# Patient Record
Sex: FEMALE | Race: WHITE | Employment: OTHER | URBAN - METROPOLITAN AREA
[De-identification: names, ages, dates, MRNs, and addresses within clinical notes are randomized per-mention and may not be internally consistent; named-entity substitution may affect disease eponyms.]

---

## 2018-04-03 RX ORDER — ATORVASTATIN CALCIUM 10 MG/1
10 TABLET, FILM COATED ORAL DAILY
COMMUNITY
End: 2018-06-18 | Stop reason: SDUPTHER

## 2018-04-03 RX ORDER — ENALAPRIL MALEATE 20 MG/1
20 TABLET ORAL DAILY
COMMUNITY
End: 2020-10-13

## 2018-08-14 PROBLEM — I10 ESSENTIAL HYPERTENSION: Status: ACTIVE | Noted: 2018-08-14

## 2018-08-14 PROBLEM — E55.9 VITAMIN D INSUFFICIENCY: Status: ACTIVE | Noted: 2018-08-14

## 2018-08-14 PROBLEM — E78.2 MIXED HYPERLIPIDEMIA: Status: ACTIVE | Noted: 2018-08-14

## 2020-07-08 ENCOUNTER — HOSPITAL ENCOUNTER (EMERGENCY)
Age: 80
Discharge: HOME | End: 2020-07-08
Payer: MEDICARE

## 2020-07-08 VITALS
SYSTOLIC BLOOD PRESSURE: 141 MMHG | HEART RATE: 96 BPM | WEIGHT: 195 LBS | BODY MASS INDEX: 38.28 KG/M2 | HEIGHT: 60 IN | TEMPERATURE: 97.3 F | DIASTOLIC BLOOD PRESSURE: 98 MMHG | OXYGEN SATURATION: 98 % | RESPIRATION RATE: 16 BRPM

## 2020-07-08 PROCEDURE — 99284 EMERGENCY DEPT VISIT MOD MDM: CPT

## 2020-07-08 PROCEDURE — U0003 INFECTIOUS AGENT DETECTION BY NUCLEIC ACID (DNA OR RNA); SEVERE ACUTE RESPIRATORY SYNDROME CORONAVIRUS 2 (SARS-COV-2) (CORONAVIRUS DISEASE [COVID-19]), AMPLIFIED PROBE TECHNIQUE, MAKING USE OF HIGH THROUGHPUT TECHNOLOGIES AS DESCRIBED BY CMS-2020-01-R: HCPCS

## 2020-07-08 NOTE — ED PROVIDER NOTES
[facility]    HPI:  7/8/20, Time: 7:49 PM EDT         Lafaye Crigler is a 67 y.o. female presenting to the ED for a COVID-19 test.  Patient states that her  was recently hospitalized and had an outpatient x-ray obtained in which they feel he is positive for the coronavirus. Patient states she wanted to get checked to ensure that she does not have it. She is denying any type of coughing, fevers fatigue or chills at this time. She is having no complaints. Review of Systems:   Pertinent positives and negatives are stated within HPI, all other systems reviewed and are negative.          --------------------------------------------- PAST HISTORY ---------------------------------------------  Past Medical History:  has a past medical history of Hyperlipidemia and Hypertension. Past Surgical History:  has no past surgical history on file. Social History:  reports that she has never smoked. She has never used smokeless tobacco. She reports that she does not drink alcohol or use drugs. Family History: family history is not on file. The patients home medications have been reviewed. Allergies: Patient has no known allergies. -------------------------------------------------- RESULTS -------------------------------------------------  All laboratory and radiology results have been personally reviewed by myself   LABS:  Results for orders placed or performed during the hospital encounter of 07/08/20   Covid-19 Ambulatory   Result Value Ref Range    Source NP swab        RADIOLOGY:  Interpreted by Radiologist.  No orders to display       ------------------------- NURSING NOTES AND VITALS REVIEWED ---------------------------   The nursing notes within the ED encounter and vital signs as below have been reviewed.    BP (!) 141/98   Pulse 96   Temp 97.3 °F (36.3 °C) (Temporal)   Resp 16   Ht 5' (1.524 m)   Wt 195 lb (88.5 kg)   LMP  (LMP Unknown)   SpO2 98%   BMI 38.08 kg/m²   Oxygen Saturation Interpretation: Normal      ---------------------------------------------------PHYSICAL EXAM--------------------------------------      Constitutional/General: Alert and oriented x3, well appearing, non toxic in NAD  Head: Normocephalic and atraumatic  Eyes: PERRL, EOMI  Mouth: Oropharynx clear, handling secretions, no trismus  Neck: Supple, full ROM,   Pulmonary: Lungs clear to auscultation bilaterally, no wheezes, rales, or rhonchi. Not in respiratory distress  Cardiovascular:  Regular rate and rhythm, no murmurs, gallops, or rubs. 2+ distal pulses  Abdomen: Soft, non tender, non distended,   Extremities: Moves all extremities x 4. Warm and well perfused  Skin: warm and dry without rash  Neurologic: GCS 15,  Psych: Normal Affect      ------------------------------ ED COURSE/MEDICAL DECISION MAKING----------------------  Medications - No data to display      ED COURSE:       Medical Decision Making:    Patient is a 79-year-old female who came to the emergency department for a COVID-19 test.  She is denying any types of symptoms at this time. Patient's oxygen saturation was 98%, her lungs were clear and equal bilaterally. A send out COVID-19 test was obtained at this time. I did inform her that it takes usually 3 days for results to come back. He is to follow with her primary care provider. I did also inform her that due to the fact that her  might be positive she needs to start quarantining herself from her friends and family. Counseling: The emergency provider has spoken with the patient and discussed todays results, in addition to providing specific details for the plan of care and counseling regarding the diagnosis and prognosis. Questions are answered at this time and they are agreeable with the plan.      --------------------------------- IMPRESSION AND DISPOSITION ---------------------------------    IMPRESSION  1.  Encounter for laboratory testing for COVID-19 virus DISPOSITION  Disposition: Discharge to home  Patient condition is good      NOTE: This report was transcribed using voice recognition software.  Every effort was made to ensure accuracy; however, inadvertent computerized transcription errors may be present      GABBY Saleem NP  07/08/20 1951

## 2020-07-09 ENCOUNTER — CARE COORDINATION (OUTPATIENT)
Dept: CARE COORDINATION | Age: 80
End: 2020-07-09

## 2020-07-09 NOTE — CARE COORDINATION
LSW attempted to contact Eric Gifford for COVID-19 follow up calls. There was no answer; VM and call back number left  Eric Gifford presented to the ER with request for COVID-19 testing. States  may have COVID-23 and she wanted to be tested to see if she had it. Test results are pending still.

## 2020-07-10 NOTE — CARE COORDINATION
Patient contacted regarding recent visit . This Ghazala Denton contacted the patient by telephone to perform post discharge call. Verified name and  with patient as identifiers. Provided introduction to self, and reason for call due to viral symptoms of infection and/or exposure to COVID-19. Patient presented to emergency department/flu clinic with complaints of COVID-19 testing per patient request.   Patient reports symptoms are not present, never had symptoms only went in for the testing. Due to no new or worsening symptoms the RN CTN/TIAN was not notified for escalation. Discussed exposure protocols and quarantine with CDC Guidelines What To Do If You Are Sick    Patient was given an opportunity for questions and concerns. Stay home except to get medical care    Separate yourself from other people and animals in your home    Call ahead before visiting your doctor    Wear a facemask    Cover your coughs and sneezes    Clean your hands often    Avoid sharing personal household items    Clean all high-touch surfaces everyday    Monitor your symptoms  Seek prompt medical attention if your illness is worsening (e.g., difficulty breathing). Before seeking care, call your healthcare provider and tell them that you have, or are being evaluated for, COVID-19. Put on a facemask before you enter the facility. These steps will help the healthcare provider's office to keep other people in the office or waiting room from getting infected or exposed. Ask your healthcare provider to call the local or [facility] health department. Persons who are placed under If you have a medical emergency and need to call 911, notify the dispatch personnel that you have, or are being evaluated for COVID-19. If possible, put on a facemask before emergency medical services arrive.     The patient agrees to contact the [facility] exposure line [phone], local health department [place] [facility]: ([phone]) and PCP office for questions related to their healthcare. Author provided contact information for future reference. Patient/family/caregiver given information for [facility] and agrees to enroll no  Patient's preferred e-mail: Declined  Patient's preferred phone number: Declined  Based on Loop alert triggers, patient will be contacted by nurse care manager for worsening symptoms. LSW spoke with [facility] today. States she never had symptoms or felt ill. She went in for testing per PCP request after  had been inpt for knee surgery. Declined loop and all follow up calls.   Episode resolved and LSW removed from care team.

## 2020-07-11 LAB
SARS-COV-2: NOT DETECTED
SOURCE: NORMAL

## 2020-10-12 PROBLEM — E66.01 MORBIDLY OBESE (HCC): Status: ACTIVE | Noted: 2020-10-12

## 2024-09-02 ENCOUNTER — APPOINTMENT (OUTPATIENT)
Dept: GENERAL RADIOLOGY | Age: 84
End: 2024-09-02
Payer: MEDICARE

## 2024-09-02 ENCOUNTER — HOSPITAL ENCOUNTER (EMERGENCY)
Age: 84
Discharge: HOME | End: 2024-09-03
Attending: EMERGENCY MEDICINE
Payer: MEDICARE

## 2024-09-02 VITALS
BODY MASS INDEX: 38.28 KG/M2 | OXYGEN SATURATION: 96 % | RESPIRATION RATE: 18 BRPM | HEART RATE: 106 BPM | HEIGHT: 60 IN | SYSTOLIC BLOOD PRESSURE: 192 MMHG | TEMPERATURE: 98.5 F | DIASTOLIC BLOOD PRESSURE: 85 MMHG | WEIGHT: 195 LBS

## 2024-09-02 DIAGNOSIS — L03.115 CELLULITIS OF RIGHT LOWER EXTREMITY: ICD-10-CM

## 2024-09-02 DIAGNOSIS — R60.0 LOCALIZED EDEMA: Primary | ICD-10-CM

## 2024-09-02 PROCEDURE — 82550 ASSAY OF CK (CPK): CPT

## 2024-09-02 PROCEDURE — 83735 ASSAY OF MAGNESIUM: CPT

## 2024-09-02 PROCEDURE — 83690 ASSAY OF LIPASE: CPT

## 2024-09-02 PROCEDURE — 80053 COMPREHEN METABOLIC PANEL: CPT

## 2024-09-02 PROCEDURE — 85610 PROTHROMBIN TIME: CPT

## 2024-09-02 PROCEDURE — 73590 X-RAY EXAM OF LOWER LEG: CPT

## 2024-09-02 PROCEDURE — 85379 FIBRIN DEGRADATION QUANT: CPT

## 2024-09-02 PROCEDURE — 85027 COMPLETE CBC AUTOMATED: CPT

## 2024-09-02 PROCEDURE — 82150 ASSAY OF AMYLASE: CPT

## 2024-09-02 PROCEDURE — 82248 BILIRUBIN DIRECT: CPT

## 2024-09-02 PROCEDURE — 99285 EMERGENCY DEPT VISIT HI MDM: CPT

## 2024-09-02 PROCEDURE — 83880 ASSAY OF NATRIURETIC PEPTIDE: CPT

## 2024-09-02 PROCEDURE — 71045 X-RAY EXAM CHEST 1 VIEW: CPT

## 2024-09-02 PROCEDURE — 93005 ELECTROCARDIOGRAM TRACING: CPT | Performed by: EMERGENCY MEDICINE

## 2024-09-02 PROCEDURE — 84484 ASSAY OF TROPONIN QUANT: CPT

## 2024-09-02 RX ORDER — ASPIRIN 81 MG/1
81 TABLET ORAL DAILY
COMMUNITY

## 2024-09-02 RX ORDER — DOXYCYCLINE 100 MG/1
100 CAPSULE ORAL ONCE
Status: COMPLETED | OUTPATIENT
Start: 2024-09-02 | End: 2024-09-03

## 2024-09-02 RX ORDER — VITAMIN E (DL,TOCOPHERYL ACET) 180 MG
CAPSULE ORAL
COMMUNITY

## 2024-09-02 ASSESSMENT — LIFESTYLE VARIABLES
HOW MANY STANDARD DRINKS CONTAINING ALCOHOL DO YOU HAVE ON A TYPICAL DAY: PATIENT DOES NOT DRINK
HOW OFTEN DO YOU HAVE A DRINK CONTAINING ALCOHOL: NEVER

## 2024-09-02 ASSESSMENT — PAIN - FUNCTIONAL ASSESSMENT: PAIN_FUNCTIONAL_ASSESSMENT: NONE - DENIES PAIN

## 2024-09-03 PROBLEM — R60.0 LOCALIZED EDEMA: Status: ACTIVE | Noted: 2024-09-03

## 2024-09-03 PROBLEM — L03.115 CELLULITIS OF RIGHT LOWER EXTREMITY: Status: ACTIVE | Noted: 2024-09-03

## 2024-09-03 LAB
ALBUMIN SERPL-MCNC: 4.1 G/DL (ref 3.5–5.2)
ALP SERPL-CCNC: 118 U/L (ref 35–104)
ALT SERPL-CCNC: 16 U/L (ref 0–32)
AMYLASE SERPL-CCNC: 63 U/L (ref 20–100)
ANION GAP SERPL CALCULATED.3IONS-SCNC: 15 MMOL/L (ref 7–16)
AST SERPL-CCNC: 8 U/L (ref 0–31)
BILIRUB DIRECT SERPL-MCNC: <0.2 MG/DL (ref 0–0.3)
BILIRUB INDIRECT SERPL-MCNC: ABNORMAL MG/DL (ref 0–1)
BILIRUB SERPL-MCNC: 0.3 MG/DL (ref 0–1.2)
BNP SERPL-MCNC: 137 PG/ML (ref 0–450)
BUN SERPL-MCNC: 15 MG/DL (ref 6–23)
CALCIUM SERPL-MCNC: 9.6 MG/DL (ref 8.6–10.2)
CHLORIDE SERPL-SCNC: 102 MMOL/L (ref 98–107)
CK SERPL-CCNC: 101 U/L (ref 20–180)
CO2 SERPL-SCNC: 24 MMOL/L (ref 22–29)
CREAT SERPL-MCNC: 0.8 MG/DL (ref 0.5–1)
D-DIMER QUANTITATIVE: <200 NG/ML DDU (ref 0–230)
ERYTHROCYTE [DISTWIDTH] IN BLOOD BY AUTOMATED COUNT: 14.5 % (ref 11.5–15)
GFR, ESTIMATED: 72 ML/MIN/1.73M2
GLUCOSE SERPL-MCNC: 128 MG/DL (ref 74–99)
HCT VFR BLD AUTO: 42.3 % (ref 34–48)
HGB BLD-MCNC: 13.4 G/DL (ref 11.5–15.5)
INR PPP: 1
LIPASE SERPL-CCNC: 20 U/L (ref 13–60)
MAGNESIUM SERPL-MCNC: 2.1 MG/DL (ref 1.6–2.6)
MCH RBC QN AUTO: 28.5 PG (ref 26–35)
MCHC RBC AUTO-ENTMCNC: 31.7 G/DL (ref 32–34.5)
MCV RBC AUTO: 90 FL (ref 80–99.9)
PLATELET # BLD AUTO: 338 K/UL (ref 130–450)
PMV BLD AUTO: 10.2 FL (ref 7–12)
POTASSIUM SERPL-SCNC: 4.2 MMOL/L (ref 3.5–5)
PROT SERPL-MCNC: 7.3 G/DL (ref 6.4–8.3)
PROTHROMBIN TIME: 10.3 SEC (ref 9.3–12.4)
RBC # BLD AUTO: 4.7 M/UL (ref 3.5–5.5)
SODIUM SERPL-SCNC: 141 MMOL/L (ref 132–146)
TROPONIN I SERPL HS-MCNC: 12 NG/L (ref 0–9)
WBC OTHER # BLD: 12.5 K/UL (ref 4.5–11.5)

## 2024-09-03 PROCEDURE — 6370000000 HC RX 637 (ALT 250 FOR IP): Performed by: EMERGENCY MEDICINE

## 2024-09-03 RX ORDER — DOXYCYCLINE HYCLATE 100 MG
100 TABLET ORAL 2 TIMES DAILY
Qty: 20 TABLET | Refills: 0 | Status: SHIPPED | OUTPATIENT
Start: 2024-09-03 | End: 2024-09-13

## 2024-09-03 RX ADMIN — DOXYCYCLINE HYCLATE 100 MG: 100 CAPSULE ORAL at 00:01

## 2024-09-03 RX ADMIN — AMOXICILLIN AND CLAVULANATE POTASSIUM 1 TABLET: 875; 125 TABLET, FILM COATED ORAL at 00:01

## 2024-09-03 NOTE — DISCHARGE INSTRUCTIONS
Return if increased fevers vomiting chest pain shortness of breath make sure you get an ultrasound in the morning continue the antibiotics keep your legs elevated

## 2024-09-03 NOTE — ED PROVIDER NOTES
HPI:  9/2/24, Time: 11:23 PM EDT         Natalie Toure is a 83 y.o. female presenting to the ED for right leg swelling and edema, beginning days ago.  The complaint has been persistent, mild in severity, and worsened by nothing.  Apparently was more red earlier no significant cellulitis but apparently she showed me a picture did look on her leg cellulitis we will start her on antibiotics and we will check her heart function also give her a prescription to get an ultrasound of the right leg and give her Lovenox if everything else is stable she denies any chest pain trouble breathing she is not a diabetic she has been urinating normal    ROS:   Pertinent positives and negatives are stated within HPI, all other systems reviewed and are negative.  --------------------------------------------- PAST HISTORY ---------------------------------------------  Past Medical History:  has a past medical history of Hyperlipidemia and Hypertension.    Past Surgical History:  has no past surgical history on file.    Social History:  reports that she has never smoked. She has never used smokeless tobacco. She reports that she does not drink alcohol and does not use drugs.    Family History: family history is not on file.     The patient’s home medications have been reviewed.    Allergies: Patient has no known allergies.    ---------------------------------------------------PHYSICAL EXAM--------------------------------------    Constitutional/General: Alert and oriented x3, well appearing, non toxic in NAD  Head: Normocephalic and atraumatic  Eyes: PERRL, EOMI  Mouth: Oropharynx clear, handling secretions, no trismus  Neck: Supple, full ROM, non tender to palpation in the midline, no stridor, no crepitus, no meningeal signs  Pulmonary: Lungs clear to auscultation bilaterally, no wheezes, rales, or rhonchi. Not in respiratory distress  Cardiovascular:  Regular rate. Regular rhythm. No murmurs, gallops, or rubs. 2+ distal

## 2024-09-04 LAB
EKG ATRIAL RATE: 81 BPM
EKG P AXIS: 67 DEGREES
EKG P-R INTERVAL: 130 MS
EKG Q-T INTERVAL: 384 MS
EKG QRS DURATION: 88 MS
EKG QTC CALCULATION (BAZETT): 446 MS
EKG R AXIS: 16 DEGREES
EKG T AXIS: 15 DEGREES
EKG VENTRICULAR RATE: 81 BPM

## 2024-09-04 PROCEDURE — 93010 ELECTROCARDIOGRAM REPORT: CPT | Performed by: INTERNAL MEDICINE

## 2024-09-12 ENCOUNTER — HOSPITAL ENCOUNTER (OUTPATIENT)
Dept: ULTRASOUND IMAGING | Age: 84
Discharge: HOME | End: 2024-09-14
Attending: EMERGENCY MEDICINE
Payer: MEDICARE

## 2024-09-12 DIAGNOSIS — R60.0 LOCALIZED EDEMA: ICD-10-CM

## 2024-09-12 PROCEDURE — 93971 EXTREMITY STUDY: CPT

## 2025-03-21 ENCOUNTER — APPOINTMENT (OUTPATIENT)
Dept: CT IMAGING | Age: 85
End: 2025-03-21
Payer: MEDICARE

## 2025-03-21 ENCOUNTER — HOSPITAL ENCOUNTER (OUTPATIENT)
Age: 85
Setting detail: OBSERVATION
Discharge: HOME | End: 2025-03-22
Attending: EMERGENCY MEDICINE | Admitting: INTERNAL MEDICINE
Payer: MEDICARE

## 2025-03-21 ENCOUNTER — APPOINTMENT (OUTPATIENT)
Dept: GENERAL RADIOLOGY | Age: 85
End: 2025-03-21
Payer: MEDICARE

## 2025-03-21 DIAGNOSIS — S22.000A COMPRESSION FRACTURE OF BODY OF THORACIC VERTEBRA (HCC): Primary | ICD-10-CM
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
## 2025-03-21 DIAGNOSIS — S22.080A T12 COMPRESSION FRACTURE, INITIAL ENCOUNTER (HCC): ICD-10-CM

## 2025-03-21 LAB
ANION GAP SERPL CALCULATED.3IONS-SCNC: 16 MMOL/L (ref 7–16)
BUN SERPL-MCNC: 28 MG/DL (ref 6–23)
CALCIUM SERPL-MCNC: 9.7 MG/DL (ref 8.6–10.2)
CHLORIDE SERPL-SCNC: 100 MMOL/L (ref 98–107)
CO2 SERPL-SCNC: 26 MMOL/L (ref 22–29)
CREAT SERPL-MCNC: 0.9 MG/DL (ref 0.5–1)
ERYTHROCYTE [DISTWIDTH] IN BLOOD BY AUTOMATED COUNT: 15.9 % (ref 11.5–15)
GFR, ESTIMATED: 68 ML/MIN/1.73M2
GLUCOSE SERPL-MCNC: 153 MG/DL (ref 74–99)
HCT VFR BLD AUTO: 46.9 % (ref 34–48)
HGB BLD-MCNC: 14.3 G/DL (ref 11.5–15.5)
MCH RBC QN AUTO: 28.6 PG (ref 26–35)
MCHC RBC AUTO-ENTMCNC: 30.5 G/DL (ref 32–34.5)
MCV RBC AUTO: 93.8 FL (ref 80–99.9)
PLATELET # BLD AUTO: 327 K/UL (ref 130–450)
PMV BLD AUTO: 10.9 FL (ref 7–12)
POTASSIUM SERPL-SCNC: 4 MMOL/L (ref 3.5–5)
RBC # BLD AUTO: 5 M/UL (ref 3.5–5.5)
SODIUM SERPL-SCNC: 142 MMOL/L (ref 132–146)
WBC OTHER # BLD: 19.8 K/UL (ref 4.5–11.5)

## 2025-03-21 PROCEDURE — 72131 CT LUMBAR SPINE W/O DYE: CPT

## 2025-03-21 PROCEDURE — 85027 COMPLETE CBC AUTOMATED: CPT

## 2025-03-21 PROCEDURE — 90714 TD VACC NO PRESV 7 YRS+ IM: CPT | Performed by: EMERGENCY MEDICINE

## 2025-03-21 PROCEDURE — 90471 IMMUNIZATION ADMIN: CPT | Performed by: EMERGENCY MEDICINE

## 2025-03-21 PROCEDURE — 6370000000 HC RX 637 (ALT 250 FOR IP): Performed by: EMERGENCY MEDICINE

## 2025-03-21 PROCEDURE — 80048 BASIC METABOLIC PNL TOTAL CA: CPT

## 2025-03-21 PROCEDURE — 72170 X-RAY EXAM OF PELVIS: CPT

## 2025-03-21 PROCEDURE — 6360000002 HC RX W HCPCS: Performed by: EMERGENCY MEDICINE

## 2025-03-21 PROCEDURE — 6370000000 HC RX 637 (ALT 250 FOR IP): Performed by: STUDENT IN AN ORGANIZED HEALTH CARE EDUCATION/TRAINING PROGRAM

## 2025-03-21 PROCEDURE — 71045 X-RAY EXAM CHEST 1 VIEW: CPT

## 2025-03-21 PROCEDURE — 96374 THER/PROPH/DIAG INJ IV PUSH: CPT

## 2025-03-21 PROCEDURE — 99285 EMERGENCY DEPT VISIT HI MDM: CPT

## 2025-03-21 RX ORDER — AMLODIPINE BESYLATE 5 MG/1
5 TABLET ORAL DAILY
Status: DISCONTINUED | OUTPATIENT
Start: 2025-03-22 | End: 2025-03-22 | Stop reason: HOSPADM

## 2025-03-21 RX ORDER — LOSARTAN POTASSIUM 50 MG/1
100 TABLET ORAL DAILY
Status: DISCONTINUED | OUTPATIENT
Start: 2025-03-21 | End: 2025-03-22 | Stop reason: HOSPADM

## 2025-03-21 RX ORDER — ROSUVASTATIN CALCIUM 10 MG/1
10 TABLET, COATED ORAL NIGHTLY
Status: DISCONTINUED | OUTPATIENT
Start: 2025-03-21 | End: 2025-03-22 | Stop reason: HOSPADM

## 2025-03-21 RX ORDER — LIDOCAINE 4 G/G
1 PATCH TOPICAL DAILY
Status: DISCONTINUED | OUTPATIENT
Start: 2025-03-21 | End: 2025-03-22 | Stop reason: HOSPADM

## 2025-03-21 RX ORDER — ACETAMINOPHEN 500 MG
1000 TABLET ORAL ONCE
Status: COMPLETED | OUTPATIENT
Start: 2025-03-21 | End: 2025-03-21

## 2025-03-21 RX ORDER — FENTANYL CITRATE 50 UG/ML
25 INJECTION, SOLUTION INTRAMUSCULAR; INTRAVENOUS ONCE
Refills: 0 | Status: COMPLETED | OUTPATIENT
Start: 2025-03-21 | End: 2025-03-21

## 2025-03-21 RX ADMIN — FENTANYL CITRATE 25 MCG: 50 INJECTION INTRAMUSCULAR; INTRAVENOUS at 18:53

## 2025-03-21 RX ADMIN — ACETAMINOPHEN 1000 MG: 500 TABLET ORAL at 22:47

## 2025-03-21 RX ADMIN — CLOSTRIDIUM TETANI TOXOID ANTIGEN (FORMALDEHYDE INACTIVATED) AND CORYNEBACTERIUM DIPHTHERIAE TOXOID ANTIGEN (FORMALDEHYDE INACTIVATED) 0.5 ML: 5; 2 INJECTION, SUSPENSION INTRAMUSCULAR at 17:40

## 2025-03-21 ASSESSMENT — PAIN DESCRIPTION - LOCATION
LOCATION: BACK

## 2025-03-21 ASSESSMENT — PAIN DESCRIPTION - DESCRIPTORS: DESCRIPTORS: PRESSURE

## 2025-03-21 ASSESSMENT — PAIN SCALES - GENERAL
PAINLEVEL_OUTOF10: 6
PAINLEVEL_OUTOF10: 2
PAINLEVEL_OUTOF10: 2

## 2025-03-21 ASSESSMENT — PAIN - FUNCTIONAL ASSESSMENT: PAIN_FUNCTIONAL_ASSESSMENT: 0-10

## 2025-03-21 NOTE — ED PROVIDER NOTES
HPI:  3/21/25, Time: 3:51 PM EDT         Natalie Toure is a 84 y.o. female presenting to the ED for fall complaining of lower back pain.  Patient states she was at a local grocery store when she was try to reach out to take something off the top shelf when she fell backwards landing on her back.  She not strike her head no loss of consciousness does report some dizziness.  She has no hemoptysis.  No pain with deep breathing.  Reports no blood thinners.  Except aspirin., beginning several hours ago.  The complaint has been persistent, moderate in severity, and worsened by nothing.      Review of Systems:   A complete review of systems was performed and pertinent positives and negatives are stated within HPI, all other systems reviewed and are negative.    --------------------------------------------- PAST HISTORY ---------------------------------------------  Past Medical History:  has a past medical history of Hyperlipidemia and Hypertension.    Past Surgical History:  has no past surgical history on file.    Social History:  reports that she has never smoked. She has never used smokeless tobacco. She reports that she does not drink alcohol and does not use drugs.    Family History: family history is not on file.     The patient’s home medications have been reviewed.    Allergies: Patient has no known allergies.    -------------------------------------------------- RESULTS -------------------------------------------------  All laboratory and radiology results have been personally reviewed by myself   LABS:  Results for orders placed or performed during the hospital encounter of 03/21/25   CBC   Result Value Ref Range    WBC 19.8 (H) 4.5 - 11.5 k/uL    RBC 5.00 3.50 - 5.50 m/uL    Hemoglobin 14.3 11.5 - 15.5 g/dL    Hematocrit 46.9 34.0 - 48.0 %    MCV 93.8 80.0 - 99.9 fL    MCH 28.6 26.0 - 35.0 pg    MCHC 30.5 (L) 32.0 - 34.5 g/dL    RDW 15.9 (H) 11.5 - 15.0 %    Platelets 327 130 - 450 k/uL    MPV 10.9 7.0 -  administration in time range)     Or   potassium bicarb-citric acid (EFFER-K) effervescent tablet 40 mEq (has no administration in time range)     Or   potassium chloride 10 mEq/100 mL IVPB (Peripheral Line) (has no administration in time range)   magnesium sulfate 2000 mg in 50 mL IVPB premix (has no administration in time range)   enoxaparin (LOVENOX) injection 40 mg (40 mg SubCUTAneous Given 3/22/25 1120)   ondansetron (ZOFRAN-ODT) disintegrating tablet 4 mg (has no administration in time range)     Or   ondansetron (ZOFRAN) injection 4 mg (has no administration in time range)   senna (SENOKOT) tablet 8.6 mg (has no administration in time range)   acetaminophen (TYLENOL) tablet 650 mg (has no administration in time range)     Or   acetaminophen (TYLENOL) suppository 650 mg (has no administration in time range)   oxyCODONE (ROXICODONE) immediate release tablet 5 mg (5 mg Oral Given 3/22/25 0338)   aspirin EC tablet 81 mg ( Oral Automatically Held 3/25/25 0900)   fenofibrate tablet 160 mg (160 mg Oral Given 3/22/25 1120)   morphine (PF) injection 2 mg (has no administration in time range)   tetanus & diphtheria toxoids (adult) 5-2 LFU injection 0.5 mL (0.5 mLs IntraMUSCular Given 3/21/25 1740)   fentaNYL (SUBLIMAZE) injection 25 mcg (25 mcg IntraVENous Given 3/21/25 1853)   acetaminophen (TYLENOL) tablet 1,000 mg (1,000 mg Oral Given 3/21/25 2247)             Medical Decision Making:    Patient presents with mechanical fall onto her back.  Complaint of low back pain.  She is sent for CT scan of the lumbar spine showed T12 compression fracture comminuted with retropulsion.  She has no neurodeficits.  Consultation was made with neurosurgery for recommendations.  Patient is given Tylenol and Sublimaze for pain control.    History From: Patient and daughter    CC/HPI Summary, DDx, ED Course, Reassessment, Tests Considered, Patient expectation:   Differential diagnose includes but limited to compression fracture,

## 2025-03-22 VITALS
TEMPERATURE: 98.2 F | SYSTOLIC BLOOD PRESSURE: 123 MMHG | BODY MASS INDEX: 35.34 KG/M2 | DIASTOLIC BLOOD PRESSURE: 73 MMHG | HEIGHT: 60 IN | RESPIRATION RATE: 18 BRPM | OXYGEN SATURATION: 93 % | WEIGHT: 180 LBS | HEART RATE: 91 BPM

## 2025-03-22 PROBLEM — S22.080A T12 COMPRESSION FRACTURE, INITIAL ENCOUNTER (HCC): Status: ACTIVE | Noted: 2025-03-22

## 2025-03-22 LAB
ALBUMIN SERPL-MCNC: 3.6 G/DL (ref 3.5–5.2)
ALP SERPL-CCNC: 106 U/L (ref 35–104)
ALT SERPL-CCNC: 19 U/L (ref 0–32)
ANION GAP SERPL CALCULATED.3IONS-SCNC: 10 MMOL/L (ref 7–16)
AST SERPL-CCNC: 20 U/L (ref 0–31)
BILIRUB SERPL-MCNC: 0.6 MG/DL (ref 0–1.2)
BUN SERPL-MCNC: 20 MG/DL (ref 6–23)
CALCIUM SERPL-MCNC: 9 MG/DL (ref 8.6–10.2)
CHLORIDE SERPL-SCNC: 103 MMOL/L (ref 98–107)
CO2 SERPL-SCNC: 26 MMOL/L (ref 22–29)
CREAT SERPL-MCNC: 0.8 MG/DL (ref 0.5–1)
GFR, ESTIMATED: 76 ML/MIN/1.73M2
GLUCOSE SERPL-MCNC: 109 MG/DL (ref 74–99)
POTASSIUM SERPL-SCNC: 4.2 MMOL/L (ref 3.5–5)
PROT SERPL-MCNC: 6.6 G/DL (ref 6.4–8.3)
SODIUM SERPL-SCNC: 139 MMOL/L (ref 132–146)

## 2025-03-22 PROCEDURE — 2500000003 HC RX 250 WO HCPCS: Performed by: PHYSICIAN ASSISTANT

## 2025-03-22 PROCEDURE — G0378 HOSPITAL OBSERVATION PER HR: HCPCS

## 2025-03-22 PROCEDURE — 6360000002 HC RX W HCPCS: Performed by: PHYSICIAN ASSISTANT

## 2025-03-22 PROCEDURE — 6370000000 HC RX 637 (ALT 250 FOR IP): Performed by: PHYSICIAN ASSISTANT

## 2025-03-22 PROCEDURE — 80053 COMPREHEN METABOLIC PANEL: CPT

## 2025-03-22 PROCEDURE — 96372 THER/PROPH/DIAG INJ SC/IM: CPT

## 2025-03-22 PROCEDURE — 36415 COLL VENOUS BLD VENIPUNCTURE: CPT

## 2025-03-22 RX ORDER — ONDANSETRON 4 MG/1
4 TABLET, ORALLY DISINTEGRATING ORAL EVERY 8 HOURS PRN
Status: DISCONTINUED | OUTPATIENT
Start: 2025-03-22 | End: 2025-03-22 | Stop reason: HOSPADM

## 2025-03-22 RX ORDER — SODIUM CHLORIDE 9 MG/ML
INJECTION, SOLUTION INTRAVENOUS PRN
Status: DISCONTINUED | OUTPATIENT
Start: 2025-03-22 | End: 2025-03-22 | Stop reason: HOSPADM

## 2025-03-22 RX ORDER — MAGNESIUM SULFATE IN WATER 40 MG/ML
2000 INJECTION, SOLUTION INTRAVENOUS PRN
Status: DISCONTINUED | OUTPATIENT
Start: 2025-03-22 | End: 2025-03-22 | Stop reason: HOSPADM

## 2025-03-22 RX ORDER — ENOXAPARIN SODIUM 100 MG/ML
40 INJECTION SUBCUTANEOUS DAILY
Status: DISCONTINUED | OUTPATIENT
Start: 2025-03-22 | End: 2025-03-22 | Stop reason: HOSPADM

## 2025-03-22 RX ORDER — ACETAMINOPHEN 650 MG/1
650 SUPPOSITORY RECTAL EVERY 6 HOURS PRN
Status: DISCONTINUED | OUTPATIENT
Start: 2025-03-22 | End: 2025-03-22 | Stop reason: HOSPADM

## 2025-03-22 RX ORDER — POTASSIUM CHLORIDE 1500 MG/1
40 TABLET, EXTENDED RELEASE ORAL PRN
Status: DISCONTINUED | OUTPATIENT
Start: 2025-03-22 | End: 2025-03-22 | Stop reason: HOSPADM

## 2025-03-22 RX ORDER — OXYCODONE HYDROCHLORIDE 5 MG/1
5 TABLET ORAL EVERY 4 HOURS PRN
Status: DISCONTINUED | OUTPATIENT
Start: 2025-03-22 | End: 2025-03-22 | Stop reason: HOSPADM

## 2025-03-22 RX ORDER — SODIUM CHLORIDE 0.9 % (FLUSH) 0.9 %
10 SYRINGE (ML) INJECTION PRN
Status: DISCONTINUED | OUTPATIENT
Start: 2025-03-22 | End: 2025-03-22 | Stop reason: HOSPADM

## 2025-03-22 RX ORDER — SENNOSIDES A AND B 8.6 MG/1
1 TABLET, FILM COATED ORAL DAILY PRN
Status: DISCONTINUED | OUTPATIENT
Start: 2025-03-22 | End: 2025-03-22 | Stop reason: HOSPADM

## 2025-03-22 RX ORDER — POTASSIUM CHLORIDE 7.45 MG/ML
10 INJECTION INTRAVENOUS PRN
Status: DISCONTINUED | OUTPATIENT
Start: 2025-03-22 | End: 2025-03-22 | Stop reason: HOSPADM

## 2025-03-22 RX ORDER — ASPIRIN 81 MG/1
81 TABLET ORAL DAILY
Status: DISCONTINUED | OUTPATIENT
Start: 2025-03-22 | End: 2025-03-22 | Stop reason: HOSPADM

## 2025-03-22 RX ORDER — MORPHINE SULFATE 2 MG/ML
2 INJECTION, SOLUTION INTRAMUSCULAR; INTRAVENOUS
Status: DISCONTINUED | OUTPATIENT
Start: 2025-03-22 | End: 2025-03-22 | Stop reason: HOSPADM

## 2025-03-22 RX ORDER — OXYCODONE HYDROCHLORIDE 5 MG/1
5 TABLET ORAL EVERY 8 HOURS PRN
Qty: 10 TABLET | Refills: 0 | Status: SHIPPED | OUTPATIENT
Start: 2025-03-22 | End: 2025-03-25

## 2025-03-22 RX ORDER — ONDANSETRON 2 MG/ML
4 INJECTION INTRAMUSCULAR; INTRAVENOUS EVERY 6 HOURS PRN
Status: DISCONTINUED | OUTPATIENT
Start: 2025-03-22 | End: 2025-03-22 | Stop reason: HOSPADM

## 2025-03-22 RX ORDER — FENOFIBRATE 160 MG/1
160 TABLET ORAL DAILY
Status: DISCONTINUED | OUTPATIENT
Start: 2025-03-22 | End: 2025-03-22 | Stop reason: HOSPADM

## 2025-03-22 RX ORDER — ACETAMINOPHEN 325 MG/1
650 TABLET ORAL EVERY 6 HOURS PRN
Status: DISCONTINUED | OUTPATIENT
Start: 2025-03-22 | End: 2025-03-22 | Stop reason: HOSPADM

## 2025-03-22 RX ORDER — LIDOCAINE 4 G/G
1 PATCH TOPICAL DAILY
Qty: 5 EACH | Refills: 2 | Status: SHIPPED | OUTPATIENT
Start: 2025-03-23

## 2025-03-22 RX ORDER — SODIUM CHLORIDE 0.9 % (FLUSH) 0.9 %
10 SYRINGE (ML) INJECTION EVERY 12 HOURS SCHEDULED
Status: DISCONTINUED | OUTPATIENT
Start: 2025-03-22 | End: 2025-03-22 | Stop reason: HOSPADM

## 2025-03-22 RX ADMIN — FENOFIBRATE 160 MG: 160 TABLET ORAL at 11:20

## 2025-03-22 RX ADMIN — OXYCODONE 5 MG: 5 TABLET ORAL at 16:45

## 2025-03-22 RX ADMIN — OXYCODONE 5 MG: 5 TABLET ORAL at 03:38

## 2025-03-22 RX ADMIN — LOSARTAN POTASSIUM 100 MG: 50 TABLET, FILM COATED ORAL at 11:20

## 2025-03-22 RX ADMIN — SODIUM CHLORIDE, PRESERVATIVE FREE 10 ML: 5 INJECTION INTRAVENOUS at 11:21

## 2025-03-22 RX ADMIN — AMLODIPINE BESYLATE 5 MG: 5 TABLET ORAL at 11:19

## 2025-03-22 RX ADMIN — ENOXAPARIN SODIUM 40 MG: 100 INJECTION SUBCUTANEOUS at 11:20

## 2025-03-22 RX ADMIN — ASPIRIN 81 MG: 81 TABLET, COATED ORAL at 11:20

## 2025-03-22 ASSESSMENT — PAIN DESCRIPTION - LOCATION
LOCATION: BACK
LOCATION: BACK

## 2025-03-22 ASSESSMENT — PAIN SCALES - GENERAL
PAINLEVEL_OUTOF10: 6
PAINLEVEL_OUTOF10: 3

## 2025-03-22 ASSESSMENT — PAIN - FUNCTIONAL ASSESSMENT: PAIN_FUNCTIONAL_ASSESSMENT: 0-10

## 2025-03-22 NOTE — CARE COORDINATION
Internal Medicine On-call Care Coordination Note    I was called by the ED physician because they recommended admission for this patient and I cover their PCP.  The history as I understand it after discussion with the ED physician is as follows:    Presents with lower back pain after a fall today  Found to have T12 compression fracture  ED spoke with neurosurgery who recommends pain control and TSLO brace  Pain not controlled in ED, decision made for admission    I placed admission orders.  Including:    PT/OT  PRN pain control    Dr. Watkins or his coverage will see the patient tomorrow for H&P.    Electronically signed by Christopher Mirza PA-C on 3/22/2025 at 12:07 AM

## 2025-03-22 NOTE — PROGRESS NOTES
Occupational Therapy    OT orders received and chart reviewed. OT spoke with nursing who indicated that brace is being ordered.   OT evaluation will be deferred until back brace is delivered. Nursing is aware.  Mercy Alvarenga, OTR/L [id number]

## 2025-03-22 NOTE — DISCHARGE SUMMARY
Patient discharged within 24 hours.  Please see H&P for details of visit.       Medication List        START taking these medications      lidocaine 4 % external patch  Place 1 patch onto the skin daily  Start taking on: March 23, 2025     oxyCODONE 5 MG immediate release tablet  Commonly known as: ROXICODONE  Take 1 tablet by mouth every 8 hours as needed for Pain for up to 3 days. Max Daily Amount: 15 mg            CHANGE how you take these medications      vitamin D3 125 MCG (5000 UT) Tabs tablet  Commonly known as: CHOLECALCIFEROL  What changed: Another medication with the same name was removed. Continue taking this medication, and follow the directions you see here.            CONTINUE taking these medications      amLODIPine 5 MG tablet  Commonly known as: NORVASC  TAKE 1 TABLET BY MOUTH DAILY     aspirin 81 MG EC tablet     Biotin 5000 MCG Caps     CENTRUM SILVER PO     fenofibric acid 135 MG Cpdr capsule  Commonly known as: TRILIPIX  TAKE 1 CAPSULE DAILY     losartan 100 MG tablet  Commonly known as: COZAAR  TAKE 1 TABLET BY MOUTH DAILY     Magnesium Oxide -Mg Supplement 500 MG Caps     * Respiratory Therapy Supplies Rosalee  1 Device by Does not apply route nightly Needs CPAP SUPPLIES not machine     * Respiratory Therapy Supplies Rosalee  1 Device by Does not apply route nightly     rosuvastatin calcium 10 MG Cpsp           * This list has 2 medication(s) that are the same as other medications prescribed for you. Read the directions carefully, and ask your doctor or other care provider to review them with you.                STOP taking these medications      vitamin C 500 MG tablet  Commonly known as: ASCORBIC ACID               Where to Get Your Medications        [id number] - [facility] [address] [phone] - F [phone]  [address] [facility] [address]      Phone: [phone]   lidocaine 4 % external patch  oxyCODONE 5 MG immediate release  tablet       Electronically signed by Ramos Martinez MD on 3/22/2025 at 2:20 PM

## 2025-03-22 NOTE — PROGRESS NOTES
4 Eyes Skin Assessment     NAME:  Natalie Toure  YOB: 1940  MEDICAL RECORD NUMBER:  [id number]    The patient is being assessed for  Admission    I agree that at least one RN has performed a thorough Head to Toe Skin Assessment on the patient. ALL assessment sites listed below have been assessed.      Areas assessed by both nurses:    Head, Face, Ears, Shoulders, Back, Chest, Arms, Elbows, Hands, Sacrum. Buttock, Coccyx, Ischium, Legs. Feet and Heels, and Under Medical Devices         Does the Patient have a Wound? No noted wound(s)       B[place]ivar Prevention initiated by RN: No  Wound Care Orders initiated by RN: No    Pressure Injury (Stage 3,4, Unstageable, DTI, NWPT, and Complex wounds) if present, place Wound referral order by RN under : No    New Ostomies, if present place, Ostomy referral order under : No     Nurse 1 eSignature: Electronically signed by Paula Garcia RN on 3/22/25 at 5:21 AM EDT    **SHARE this note so that the co-signing nurse can place an eSignature**    Nurse 2 eSignature: Electronically signed by Nicole Colvin RN on 3/22/25 at 5:22 AM EDT

## 2025-03-22 NOTE — CARE COORDINATION
CASE MANAGEMENT.... Received call from the unit regarding need for TLSO brace. Called  [facility] at [phone] and the office is closed for the weekend. Unable to leave vm. Will need to call Monday 3/24/25 to place order. PT/OT on consult and will see once brace available.

## 2025-03-22 NOTE — CONSULTS
Orthopedic Spine Surgery  Consult Note    CHIEF COMPLAINT:  T12 fracture    HISTORY OF PRESENT ILLNESS:                The patient is a 84 y.o. female  complaining of increased pain at thoracolumbar junction area since fall yesterday.  She states the pain is improved today.  Pain is worsened with activity.  Pain improved by rest.  Patient does admit to history of \"sciatica\".  She denies any current lower extremity radiculopathy or numbness.  She denies any bowel or bladder incontinence or retention or saddle anesthesia.  She denies any weakness in the lower extremities.  She had CT scan in the emergency room showing T12 compression deformity.  She has had a DEXA bone density study in 2018 which showed osteopenia.        Past Medical History:        Diagnosis Date    Hyperlipidemia     Hypertension      Past Surgical History:    No past surgical history on file.  Current Medications:   Current Facility-Administered Medications: sodium chloride flush 0.9 % injection 10 mL, 10 mL, IntraVENous, 2 times per day  sodium chloride flush 0.9 % injection 10 mL, 10 mL, IntraVENous, PRN  0.9 % sodium chloride infusion, , IntraVENous, PRN  potassium chloride (KLOR-CON M) extended release tablet 40 mEq, 40 mEq, Oral, PRN **OR** potassium bicarb-citric acid (EFFER-K) effervescent tablet 40 mEq, 40 mEq, Oral, PRN **OR** potassium chloride 10 mEq/100 mL IVPB (Peripheral Line), 10 mEq, IntraVENous, PRN  magnesium sulfate 2000 mg in 50 mL IVPB premix, 2,000 mg, IntraVENous, PRN  enoxaparin (LOVENOX) injection 40 mg, 40 mg, SubCUTAneous, Daily  ondansetron (ZOFRAN-ODT) disintegrating tablet 4 mg, 4 mg, Oral, Q8H PRN **OR** ondansetron (ZOFRAN) injection 4 mg, 4 mg, IntraVENous, Q6H PRN  senna (SENOKOT) tablet 8.6 mg, 1 tablet, Oral, Daily PRN  acetaminophen (TYLENOL) tablet 650 mg, 650 mg, Oral, Q6H PRN **OR** acetaminophen (TYLENOL) suppository 650 mg, 650 mg, Rectal, Q6H PRN  oxyCODONE (ROXICODONE) immediate release tablet 5 mg, 5  Negative Babinski  No calf tenderness  1+ DP pulses    DATA:    CBC:   Lab Results   Component Value Date/Time    WBC 19.8 03/21/2025 04:21 PM    RBC 5.00 03/21/2025 04:21 PM    HGB 14.3 03/21/2025 04:21 PM    HCT 46.9 03/21/2025 04:21 PM    MCV 93.8 03/21/2025 04:21 PM    MCH 28.6 03/21/2025 04:21 PM    MCHC 30.5 03/21/2025 04:21 PM    RDW 15.9 03/21/2025 04:21 PM     03/21/2025 04:21 PM    MPV 10.9 03/21/2025 04:21 PM     PT/INR:    Lab Results   Component Value Date/Time    PROTIME 10.3 09/02/2024 11:30 PM    INR 1.0 09/02/2024 11:30 PM       Radiology  CT Lumbar:  Compression deformity T12 - likely acute.  DDD/Lumbar spondylosis worst L3-S1.  Sacralized L5    IMPRESSION:  T12 vertebral body fracture  Osteopenia    PLAN:  Discussed findings with patient and daughter.  We discussed options of nonsurgical and surgical treatment.  We discussed risk and benefits of both.  Patient states her pain is improved today and has been able to ambulate to the bathroom with tolerable pain.  She would like to continue with nonoperative treatment at this point.  She does have TLSO brace ordered.  She does not need to wear this when up but I do recommend she wears this when ambulating as this will likely help with pain.  She is okay to be up ambulating without it.  I will follow-up with her in 1 to 2 weeks for evaluation and check on progress.  I did discuss if there is any worsening of pain or development of neurologic symptoms that we would further investigate with MRI.  Patient and her daughter are in agreement with this plan and will plan to follow-up with me in 1 to 2 weeks.

## 2025-03-22 NOTE — ED NOTES
ED to Inpatient Handoff Report    Notified Marlee that electronic handoff available and patient ready for transport to room 0616.    Safety Risks: Risk of falls    Patient in Restraints: no    Constant Observer or Patient : no    Telemetry Monitoring Ordered :Yes           Order to transfer to unit without monitor:NO    Last MEWS: 1 Time completed: 0242    Deterioration Index Score:   Predictive Model Details          28 (Normal)  Factor Value    Calculated 3/22/2025 03:08 47% Age 84 years old    Deterioration Index Model 20% Respiratory rate 20     13% WBC count abnormal (19.8 k/uL)     6% Pulse oximetry 92 %     5% Sodium 142 mmol/L     4% Systolic 130     3% BUN abnormal (28 mg/dL)     1% Hematocrit 46.9 %     0% Potassium 4.0 mmol/L     0% Pulse 68     0% Temperature 98.1 °F (36.7 °C)        Vitals:    03/21/25 2240 03/22/25 0059 03/22/25 0212 03/22/25 0242   BP: (!) 149/67 (!) 140/93 138/68 130/66   Pulse: 91 91 92 68   Resp: 23 20 22 20   Temp:    98.1 °F (36.7 °C)   TempSrc:    Oral   SpO2: 93% 93% 91% 92%   Weight:       Height:             Opportunity for questions and clarification was provided.

## 2025-03-22 NOTE — H&P
Internal Medicine History & Physical     Name: Natalie Toure  : 1940  Chief Complaint: Fall (Was reaching for the top shelf in the grocery store and couldn't reach, pt reports falling backwards and hitting her back. Denies LOC, thinners, or hitting her head, does report feeling dizzy.)  Primary Care Physician: Bryon Falk MD  Admission date: 3/21/2025  Date of service: 3/22/2025     History of Present Illness  Natalie is a 84 y.o. year old female.  Patient states that she was at [employer] in [place] shopping and reached up onto a shelf to get something and then fell flat backwards and landed on her back.  States she did not pass out.  Denies any loss of consciousness or hitting her head.  States she landed on her back.  She had immediate pain and discomfort at that time.  States the pain was severe and she was unable to move at the time.  She was brought to the ER and evaluated She was found to have a compression fracture in her back.  Additionally, there were other evidence of impingement and discomfort.  Patient has been up and walking in the room.  Daughter is present at bedside.  Patient states her pain is minimal.  She denies any fevers, chills, headache, vision or hearing changes, dysuria, hematuria, constipation, diarrhea.  States that she has no other chest pain or shortness of breath.  She has no confusion, cloudiness, headache.  She states she is feeling well and is hoping to be discharged today.    Daughters at bedside.  All questions answered.      ED course:   Initial blood work and imaging studies performed. Admission recommended by ED physician. Case was discussed with ED provider. Meds in ED consisted of the following:  Medications   lidocaine 4 % external patch 1 patch (1 patch TransDERmal Patch Removed 3/22/25 1120)   amLODIPine (NORVASC) tablet 5 mg (5 mg Oral Given 3/22/25 1119)   rosuvastatin (CRESTOR) tablet 10 mg (10 mg Oral Not Given 3/21/25 2038)   losartan (COZAAR) tablet 100 mg  delivered until Monday  Consulted [facility]ospine surgery and after evaluation they did not feel that she would need the brace or to remain in the hospital given her symptoms  Appreciate recommendations  Hypertension  Blood pressure stable  Continue home med   Hyperlipidemia  Continue home medications  Obesity  Dietary recommendations given    PT/OT  Follow labs  DVT prophylaxis.  Please see orders for further management and care.   for discharge planning  Discharge plan: Plan to discharge today with pain medication patient doing well and requesting discharge.    Electronically signed by Ramos Martinez MD on 3/22/2025 at 11:49 AM      I can be reached through [employer].    NOTE:  This report was transcribed using voice recognition software.  Every effort was made to ensure accuracy; however, inadvertent computerized transcription errors may be present.